# Patient Record
Sex: MALE | Race: WHITE | NOT HISPANIC OR LATINO
[De-identification: names, ages, dates, MRNs, and addresses within clinical notes are randomized per-mention and may not be internally consistent; named-entity substitution may affect disease eponyms.]

---

## 2017-01-23 ENCOUNTER — TRANSCRIPTION ENCOUNTER (OUTPATIENT)
Age: 27
End: 2017-01-23

## 2018-12-13 ENCOUNTER — APPOINTMENT (OUTPATIENT)
Dept: OTOLARYNGOLOGY | Facility: CLINIC | Age: 28
End: 2018-12-13

## 2019-03-14 ENCOUNTER — APPOINTMENT (OUTPATIENT)
Dept: OTOLARYNGOLOGY | Facility: CLINIC | Age: 29
End: 2019-03-14

## 2019-03-15 ENCOUNTER — APPOINTMENT (OUTPATIENT)
Dept: OTOLARYNGOLOGY | Facility: CLINIC | Age: 29
End: 2019-03-15

## 2019-07-31 ENCOUNTER — APPOINTMENT (OUTPATIENT)
Dept: UROLOGY | Facility: CLINIC | Age: 29
End: 2019-07-31
Payer: MEDICAID

## 2019-07-31 VITALS
BODY MASS INDEX: 25.92 KG/M2 | HEART RATE: 87 BPM | WEIGHT: 175 LBS | SYSTOLIC BLOOD PRESSURE: 110 MMHG | DIASTOLIC BLOOD PRESSURE: 66 MMHG | HEIGHT: 69 IN | OXYGEN SATURATION: 99 % | RESPIRATION RATE: 14 BRPM

## 2019-07-31 DIAGNOSIS — N45.2 ORCHITIS: ICD-10-CM

## 2019-07-31 DIAGNOSIS — Z78.9 OTHER SPECIFIED HEALTH STATUS: ICD-10-CM

## 2019-07-31 DIAGNOSIS — Z84.1 FAMILY HISTORY OF DISORDERS OF KIDNEY AND URETER: ICD-10-CM

## 2019-07-31 PROCEDURE — 99203 OFFICE O/P NEW LOW 30 MIN: CPT

## 2019-07-31 NOTE — LETTER BODY
[Dear  ___] : Dear  [unfilled], [Consult Letter:] : I had the pleasure of evaluating your patient, [unfilled]. [Consult Closing:] : Thank you very much for allowing me to participate in the care of this patient.  If you have any questions, please do not hesitate to contact me. [FreeTextEntry1] : [address]\[place] [address]

## 2019-07-31 NOTE — PHYSICAL EXAM
[General Appearance - Well Nourished] : well nourished [Normal Appearance] : normal appearance [General Appearance - Well Developed] : well developed [Well Groomed] : well groomed [General Appearance - In No Acute Distress] : no acute distress [Edema] : no peripheral edema [Respiration, Rhythm And Depth] : normal respiratory rhythm and effort [Abdomen Soft] : soft [Exaggerated Use Of Accessory Muscles For Inspiration] : no accessory muscle use [Abdomen Tenderness] : non-tender [Costovertebral Angle Tenderness] : no ~M costovertebral angle tenderness [Urethral Meatus] : meatus normal [Penis Abnormality] : normal circumcised penis [Urinary Bladder Findings] : the bladder was normal on palpation [Epididymis] : the epididymides were normal [Scrotum] : the scrotum was normal [Testes Tenderness] : no tenderness of the testes [Testes Mass (___cm)] : there were no testicular masses [Normal Station and Gait] : the gait and station were normal for the patient's age [] : no rash [No Focal Deficits] : no focal deficits [Affect] : the affect was normal [Oriented To Time, Place, And Person] : oriented to person, place, and time [Mood] : the mood was normal [Not Anxious] : not anxious [Inguinal Lymph Nodes Enlarged Bilaterally] : inguinal

## 2019-07-31 NOTE — ASSESSMENT
[FreeTextEntry1] : Genital exam is unremarkable. He was told that laboratory testing was normal. He was reassured. Ice packs, elevation and scrotal support, anti-inflammatory agents could be used. Also scrotal ultrasound will be performed. He could call me for the results.\par \par Frank Gutierrez MD, FACS\par [facility]\par  of Urology\par \par [address], [address]\[place] [address]\par \par [address], [address]\[place] [address]\par \par Tel: [phone]\par Fax: [phone]

## 2019-07-31 NOTE — HISTORY OF PRESENT ILLNESS
[FreeTextEntry1] : He is a 29-year-old man who is seen today for initial visit. In the last one to 2 weeks, he has mild left-sided testicular discomfort. He went to an urgent care center and he was told laboratory testing for gonorrhea, chlamydia and urine culture were normal. He was given Cipro. There is no urethral discharge. There is no history of trauma. The scrotum does not look red or irritated. He does not lift heavy objects. There is no history of kidney stones.

## 2022-06-16 ENCOUNTER — APPOINTMENT (OUTPATIENT)
Dept: HUMAN REPRODUCTION | Facility: CLINIC | Age: 32
End: 2022-06-16

## 2022-12-20 ENCOUNTER — APPOINTMENT (OUTPATIENT)
Dept: DERMATOLOGY | Facility: CLINIC | Age: 32
End: 2022-12-20

## 2022-12-20 VITALS — BODY MASS INDEX: 25.18 KG/M2 | HEIGHT: 69 IN | WEIGHT: 170 LBS

## 2022-12-20 DIAGNOSIS — Q82.8 OTHER SPECIFIED CONGENITAL MALFORMATIONS OF SKIN: ICD-10-CM

## 2022-12-20 DIAGNOSIS — L73.9 FOLLICULAR DISORDER, UNSPECIFIED: ICD-10-CM

## 2022-12-20 DIAGNOSIS — L72.9 FOLLICULAR CYST OF THE SKIN AND SUBCUTANEOUS TISSUE, UNSPECIFIED: ICD-10-CM

## 2022-12-20 PROCEDURE — 99204 OFFICE O/P NEW MOD 45 MIN: CPT

## 2023-03-15 ENCOUNTER — APPOINTMENT (OUTPATIENT)
Dept: ORTHOPEDIC SURGERY | Facility: CLINIC | Age: 33
End: 2023-03-15

## 2023-04-04 ENCOUNTER — APPOINTMENT (OUTPATIENT)
Dept: ORTHOPEDIC SURGERY | Facility: CLINIC | Age: 33
End: 2023-04-04
Payer: COMMERCIAL

## 2023-04-04 VITALS — WEIGHT: 170 LBS | HEIGHT: 69 IN | BODY MASS INDEX: 25.18 KG/M2

## 2023-04-04 DIAGNOSIS — M76.822 POSTERIOR TIBIAL TENDINITIS, LEFT LEG: ICD-10-CM

## 2023-04-04 PROCEDURE — 73610 X-RAY EXAM OF ANKLE: CPT | Mod: RT

## 2023-04-04 PROCEDURE — 99203 OFFICE O/P NEW LOW 30 MIN: CPT

## 2023-04-04 RX ORDER — CLINDAMYCIN PHOSPHATE 1 G/10ML
1 GEL TOPICAL
Qty: 1 | Refills: 5 | Status: COMPLETED | COMMUNITY
Start: 2022-12-20 | End: 2023-04-04

## 2023-04-04 RX ORDER — BENZOYL PEROXIDE 5 G/100G
5 LIQUID TOPICAL
Qty: 1 | Refills: 10 | Status: COMPLETED | COMMUNITY
Start: 2022-12-20 | End: 2023-04-04

## 2023-04-04 NOTE — HISTORY OF PRESENT ILLNESS
[Sudden] : sudden [5] : 5 [0] : 0 [Sharp] : sharp [Occasional] : occasional [de-identified] : 04/04/2023 Mr. WAGNER SAMS, a 32 year old male, presents today for  right ankle pain, present for the past 3 weeks. Denies injury or trauma. Pain has improved but can't turn ankle outward without pain.  Notes occasional pain over the lower leg. Reports pain has been gradually improving over the past few weeks.  [] : no [de-identified] : running/jumping

## 2023-04-04 NOTE — PHYSICAL EXAM
[Right] : right foot and ankle [NL (40)] : plantar flexion 40 degrees [NL 30)] : inversion 30 degrees [NL (20)] : eversion 20 degrees [5___] : [facility] 5[unfilled]/5 [] : no difficulty with single heel rise [FreeTextEntry9] : pain with inversion

## 2023-04-04 NOTE — DISCUSSION/SUMMARY
[de-identified] : 32m with posterior tib tendinitis, improving \par 1) cryotherapy, rest and activity modification\par 2) recommend use of brace if pain worsens\par 3) activity as tolerated\par 4) rtc prn \par \par \par Entered by Yu Bell acting as scribe.\par Dr. Mccarthy-\par The documentation recorded by the scribe accurately reflects the service I personally performed and the decisions made by me. \par

## 2023-06-07 ENCOUNTER — APPOINTMENT (OUTPATIENT)
Dept: PAIN MANAGEMENT | Facility: CLINIC | Age: 33
End: 2023-06-07
Payer: COMMERCIAL

## 2023-06-07 VITALS — HEIGHT: 69 IN | WEIGHT: 175 LBS | BODY MASS INDEX: 25.92 KG/M2

## 2023-06-07 DIAGNOSIS — M54.17 RADICULOPATHY, LUMBOSACRAL REGION: ICD-10-CM

## 2023-06-07 PROCEDURE — 99204 OFFICE O/P NEW MOD 45 MIN: CPT

## 2023-06-07 NOTE — HISTORY OF PRESENT ILLNESS
[Lower back] : lower back [5] : 5 [Tingling] : tingling [Constant] : constant [Sleep] : sleep [Standing] : standing [Walking] : walking [Bending forward] : bending forward [Lying in bed] : lying in bed [FreeTextEntry1] : pt states he is having pain in the lower back , he bend forward putting a box  [] : no [FreeTextEntry7] : b/l feet , mid back  [de-identified] : breathing

## 2023-06-07 NOTE — DISCUSSION/SUMMARY
[de-identified] : sp lifting with acute back pain and bl paresthesias\par MRI ls spine and start medrol dose pack

## 2023-06-07 NOTE — PHYSICAL EXAM
[de-identified] : PHYSICAL EXAM\par \par Constitutional: \par Appears well, no apparent distress\par Ability to communicate: Normal\par Respiratory: non-labored breathing\par Skin: no rash noted\par Head: normocephalic, atraumatic\par Neck: no visible thyroid enlargement\par Eyes: extraocular movements intact\par Neurologic: alert and oriented x3\par Psychiatric: normal mood, affect, and behavior\par \par Lumbar Spine: \par Palpation: left and right lumbar paraspinal spasm and left and right lumbar paraspinal tenderness to palpation.\par ROM: Diminished range of motion in all plains.  Patient notes pain with lateral bending to the left and right.\par MMT: Motor exam is 5/5 through out bilateral lower extremities.\par Sensation: Light touch and pain is intact throughout bilateral lower extremities.\par Reflexes: achilles and patella reflexes are intact and  symmetrical.  No sustained clonus.\par Special Testing: Positive straight leg raise on the left and right side.\par \par

## 2023-06-19 ENCOUNTER — APPOINTMENT (OUTPATIENT)
Dept: MRI IMAGING | Facility: CLINIC | Age: 33
End: 2023-06-19

## 2024-03-01 ENCOUNTER — NON-APPOINTMENT (OUTPATIENT)
Age: 34
End: 2024-03-01

## 2024-03-03 ENCOUNTER — NON-APPOINTMENT (OUTPATIENT)
Age: 34
End: 2024-03-03

## 2024-03-03 ENCOUNTER — EMERGENCY (EMERGENCY)
Facility: HOSPITAL | Age: 34
LOS: 1 days | Discharge: HOME | End: 2024-03-03
Attending: STUDENT IN AN ORGANIZED HEALTH CARE EDUCATION/TRAINING PROGRAM
Payer: COMMERCIAL

## 2024-03-03 VITALS
OXYGEN SATURATION: 99 % | DIASTOLIC BLOOD PRESSURE: 70 MMHG | HEART RATE: 82 BPM | RESPIRATION RATE: 18 BRPM | SYSTOLIC BLOOD PRESSURE: 104 MMHG | TEMPERATURE: 98 F

## 2024-03-03 VITALS
OXYGEN SATURATION: 100 % | HEIGHT: 69 IN | TEMPERATURE: 98 F | DIASTOLIC BLOOD PRESSURE: 62 MMHG | WEIGHT: 169.98 LBS | HEART RATE: 101 BPM | SYSTOLIC BLOOD PRESSURE: 108 MMHG | RESPIRATION RATE: 18 BRPM

## 2024-03-03 LAB
ALBUMIN SERPL ELPH-MCNC: 4.3 G/DL — SIGNIFICANT CHANGE UP (ref 3.3–5)
ALP SERPL-CCNC: 75 U/L — SIGNIFICANT CHANGE UP (ref 40–120)
ALT FLD-CCNC: 39 U/L — SIGNIFICANT CHANGE UP (ref 10–45)
ANION GAP SERPL CALC-SCNC: 11 MMOL/L — SIGNIFICANT CHANGE UP (ref 5–17)
APTT BLD: 31 SEC — SIGNIFICANT CHANGE UP (ref 24.5–35.6)
AST SERPL-CCNC: 23 U/L — SIGNIFICANT CHANGE UP (ref 10–40)
BASOPHILS # BLD AUTO: 0.02 K/UL — SIGNIFICANT CHANGE UP (ref 0–0.2)
BASOPHILS NFR BLD AUTO: 0.1 % — SIGNIFICANT CHANGE UP (ref 0–2)
BILIRUB SERPL-MCNC: 0.4 MG/DL — SIGNIFICANT CHANGE UP (ref 0.2–1.2)
BUN SERPL-MCNC: 11 MG/DL — SIGNIFICANT CHANGE UP (ref 7–23)
CALCIUM SERPL-MCNC: 9.7 MG/DL — SIGNIFICANT CHANGE UP (ref 8.4–10.5)
CHLORIDE SERPL-SCNC: 102 MMOL/L — SIGNIFICANT CHANGE UP (ref 96–108)
CO2 SERPL-SCNC: 26 MMOL/L — SIGNIFICANT CHANGE UP (ref 22–31)
CREAT SERPL-MCNC: 0.91 MG/DL — SIGNIFICANT CHANGE UP (ref 0.5–1.3)
EGFR: 113 ML/MIN/1.73M2 — SIGNIFICANT CHANGE UP
EOSINOPHIL # BLD AUTO: 0.12 K/UL — SIGNIFICANT CHANGE UP (ref 0–0.5)
EOSINOPHIL NFR BLD AUTO: 0.8 % — SIGNIFICANT CHANGE UP (ref 0–6)
GLUCOSE SERPL-MCNC: 133 MG/DL — HIGH (ref 70–99)
HCT VFR BLD CALC: 42.1 % — SIGNIFICANT CHANGE UP (ref 39–50)
HGB BLD-MCNC: 14.7 G/DL — SIGNIFICANT CHANGE UP (ref 13–17)
IMM GRANULOCYTES NFR BLD AUTO: 0.4 % — SIGNIFICANT CHANGE UP (ref 0–0.9)
INR BLD: 1.16 RATIO — SIGNIFICANT CHANGE UP (ref 0.85–1.18)
LYMPHOCYTES # BLD AUTO: 1.23 K/UL — SIGNIFICANT CHANGE UP (ref 1–3.3)
LYMPHOCYTES # BLD AUTO: 8.7 % — LOW (ref 13–44)
MCHC RBC-ENTMCNC: 30.1 PG — SIGNIFICANT CHANGE UP (ref 27–34)
MCHC RBC-ENTMCNC: 34.9 GM/DL — SIGNIFICANT CHANGE UP (ref 32–36)
MCV RBC AUTO: 86.3 FL — SIGNIFICANT CHANGE UP (ref 80–100)
MONOCYTES # BLD AUTO: 1.18 K/UL — HIGH (ref 0–0.9)
MONOCYTES NFR BLD AUTO: 8.3 % — SIGNIFICANT CHANGE UP (ref 2–14)
NEUTROPHILS # BLD AUTO: 11.56 K/UL — HIGH (ref 1.8–7.4)
NEUTROPHILS NFR BLD AUTO: 81.7 % — HIGH (ref 43–77)
NRBC # BLD: 0 /100 WBCS — SIGNIFICANT CHANGE UP (ref 0–0)
PLATELET # BLD AUTO: 202 K/UL — SIGNIFICANT CHANGE UP (ref 150–400)
POTASSIUM SERPL-MCNC: 3.9 MMOL/L — SIGNIFICANT CHANGE UP (ref 3.5–5.3)
POTASSIUM SERPL-SCNC: 3.9 MMOL/L — SIGNIFICANT CHANGE UP (ref 3.5–5.3)
PROT SERPL-MCNC: 7.2 G/DL — SIGNIFICANT CHANGE UP (ref 6–8.3)
PROTHROM AB SERPL-ACNC: 12.1 SEC — SIGNIFICANT CHANGE UP (ref 9.5–13)
RBC # BLD: 4.88 M/UL — SIGNIFICANT CHANGE UP (ref 4.2–5.8)
RBC # FLD: 12.4 % — SIGNIFICANT CHANGE UP (ref 10.3–14.5)
SODIUM SERPL-SCNC: 139 MMOL/L — SIGNIFICANT CHANGE UP (ref 135–145)
WBC # BLD: 14.16 K/UL — HIGH (ref 3.8–10.5)
WBC # FLD AUTO: 14.16 K/UL — HIGH (ref 3.8–10.5)

## 2024-03-03 PROCEDURE — 96375 TX/PRO/DX INJ NEW DRUG ADDON: CPT

## 2024-03-03 PROCEDURE — 99284 EMERGENCY DEPT VISIT MOD MDM: CPT | Mod: 25

## 2024-03-03 PROCEDURE — 85610 PROTHROMBIN TIME: CPT

## 2024-03-03 PROCEDURE — 96374 THER/PROPH/DIAG INJ IV PUSH: CPT | Mod: XU

## 2024-03-03 PROCEDURE — 74177 CT ABD & PELVIS W/CONTRAST: CPT | Mod: 26,MC

## 2024-03-03 PROCEDURE — 80053 COMPREHEN METABOLIC PANEL: CPT

## 2024-03-03 PROCEDURE — 74177 CT ABD & PELVIS W/CONTRAST: CPT | Mod: MC

## 2024-03-03 PROCEDURE — 99285 EMERGENCY DEPT VISIT HI MDM: CPT

## 2024-03-03 PROCEDURE — 85025 COMPLETE CBC W/AUTO DIFF WBC: CPT

## 2024-03-03 PROCEDURE — 85730 THROMBOPLASTIN TIME PARTIAL: CPT

## 2024-03-03 PROCEDURE — 96376 TX/PRO/DX INJ SAME DRUG ADON: CPT

## 2024-03-03 PROCEDURE — 99053 MED SERV 10PM-8AM 24 HR FAC: CPT

## 2024-03-03 RX ORDER — ACETAMINOPHEN 500 MG
1000 TABLET ORAL ONCE
Refills: 0 | Status: COMPLETED | OUTPATIENT
Start: 2024-03-03 | End: 2024-03-03

## 2024-03-03 RX ORDER — LIDOCAINE 4 G/100G
1 CREAM TOPICAL ONCE
Refills: 0 | Status: DISCONTINUED | OUTPATIENT
Start: 2024-03-03 | End: 2024-03-06

## 2024-03-03 RX ORDER — KETOROLAC TROMETHAMINE 30 MG/ML
15 SYRINGE (ML) INJECTION ONCE
Refills: 0 | Status: DISCONTINUED | OUTPATIENT
Start: 2024-03-03 | End: 2024-03-03

## 2024-03-03 RX ADMIN — Medication 400 MILLIGRAM(S): at 02:05

## 2024-03-03 RX ADMIN — Medication 15 MILLIGRAM(S): at 02:05

## 2024-03-03 RX ADMIN — Medication 1000 MILLIGRAM(S): at 03:00

## 2024-03-03 RX ADMIN — Medication 15 MILLIGRAM(S): at 03:00

## 2024-03-03 RX ADMIN — Medication 15 MILLIGRAM(S): at 04:57

## 2024-03-03 NOTE — ED PROVIDER NOTE - PROGRESS NOTE DETAILS
Zach Bianchi DO (PGY2)  Patient CAT scan revealing signs of external hemorrhoid without abscess.  Patient afebrile here.  Discussed follow-up with colorectal surgery outpatient.  Educated on external hemorrhoid treatment at home.  Patient to continue sitz bath.  Will give colorectal follow-up.  Answered all questions for patient and family member at bedside prior to discharge, will discharge

## 2024-03-03 NOTE — ED ADULT NURSE NOTE - OBJECTIVE STATEMENT
33y M, no pmhx, presenting with rectal pain that started yesterday. pt states he went to UC and was told it was likely hemorrhoids and given ointment - told to go to ED if pain increases. Pt reports pain has become significantly worse since then - 8-10/10, sharp, burning pain at rectum with/without movement. also reports feeling pressure in rectum when standing. denies blood in stool, n/v/d, abd pain. no known injury or heavy lifting. denies recent straining or constipation. took Tylenol at ~ 5pm without any relief.

## 2024-03-03 NOTE — ED PROVIDER NOTE - CLINICAL SUMMARY MEDICAL DECISION MAKING FREE TEXT BOX
33-year-old male no medical history presenting with rectal pain that started yesterday.  States he went to urgent care this morning was told that he might have hemorrhoids.  Patient was given a steroid ointment.  Patient reports pain has been significantly worse since then, sharp and burning pain at the rectum.  Endorsing pressure at the rectum with standing.  Denies any bloody stools, nausea vomiting diarrhea or abdominal pain.  Vital signs stable, afebrile, not hypoxic. Plan for rectal exam, pain control 33-year-old male no medical history presenting with rectal pain that started yesterday.  States he went to urgent care this morning was told that he might have hemorrhoids.  Patient was given a steroid ointment.  Patient reports pain has been significantly worse since then, sharp and burning pain at the rectum.  Endorsing pressure at the rectum with standing.  Denies any bloody stools, nausea vomiting diarrhea or abdominal pain.  Vital signs stable, afebrile, not hypoxic. Plan for rectal exam, pain control, CT abdomen pelvis, basic labs.  Plan for evaluation of extension of bandlike structure at anal orifice

## 2024-03-03 NOTE — ED PROVIDER NOTE - PHYSICAL EXAMINATION
Zach Bianchi DO (PGY2)   Physical Exam:    Gen: NAD, AOx3  Head: NCAT  HEENT: EOMI, PEERLA  Lung: CTAB, no respiratory distress, no wheezes/rhonchi/rales B/L  CV: RRR, no murmurs, rubs or gallops  Abd: soft, NT, ND, no guarding, no rigidity, no rebound tenderness, no CVA tenderness   : Rectal Exam -   MSK: no visible deformities, ROM normal in UE/LE, no back pain  Neuro: No focal sensory or motor deficits. Sensation intact to light touch all extremities.  Skin: Warm, well perfused, no rash, no leg swelling  Psych: normal affect, calm Zach Bianchi DO (PGY2)   Physical Exam:    Gen: NAD, AOx3  Head: NCAT  HEENT: EOMI, PEERLA  Lung: CTAB, no respiratory distress, no wheezes/rhonchi/rales B/L  CV: RRR, no murmurs, rubs or gallops  Abd: soft, NT, ND, no guarding, no rigidity, no rebound tenderness, no CVA tenderness   : Rectal Exam - Engorged bandlike structure localized to inferior rectal verge, tender to palpation without obvious drainage.  MSK: no visible deformities, ROM normal in UE/LE, no back pain  Neuro: No focal sensory or motor deficits. Sensation intact to light touch all extremities.  Skin: Warm, well perfused, no rash, no leg swelling  Psych: normal affect, calm

## 2024-03-03 NOTE — ED PROVIDER NOTE - ATTENDING CONTRIBUTION TO CARE
Attending (Shawn Maldonado D.O.):  I have personally seen and examined this patient. I have performed a substantive portion of the visit including all aspects of the medical decision making. Resident, fellow, student, and/or ACP note reviewed. I agree on the plan of care except where noted.    33M here for few days of rectal pain w/o report of trauma, infx sxs, went to [facility], confused patient with their recs. Trying steroid cream w/o improvement. Hemodynam stable, NAD, benign abd, no peritoneal signs. Rectal exam done by Dr. Bianchi, w/o internal fluctuance or crepitations. at 6'o clock position, there is a region that extends to penile base which appears erythematous, thickened, and tender but feels firm, not necessarily fluctuant. Will need to eval this structure further with CT, no internal extension based on exam. Do not see signs of systemic infection. Plan to eval with labs, CT, dose analgesia.

## 2024-03-03 NOTE — ED PROVIDER NOTE - CARE PROVIDER_API CALL
Ric Hills  Colon/Rectal Surgery  [address] [place] [address], [address]  [place] [address]  Phone: [phone]  Fax: [phone]  Follow Up Time: 1-3 Days

## 2024-03-03 NOTE — ED PROVIDER NOTE - NSFOLLOWUPINSTRUCTIONS_ED_ALL_ED_FT
You are seen in the emergency department today for rectal pain.  You had a CAT scan performed which showed signs of an external hemorrhoid.    Please continue using the ointment that was prescribed to you by urgent care  You can continue to take Tylenol and Motrin.  You will be given a referral for colorectal surgery, they will call you to set up an appointment.    Eat high-fiber foods. Eat more foods that are high in fiber. This helps softens the stool and increases its bulk, which will help you avoid straining. Add fiber to your diet slowly to avoid problems with gas.    Use topical treatments. Apply a hemorrhoid cream or suppository containing hydrocortisone that you can buy without a prescription. You also can use pads containing witch hazel or a numbing medicine.    Soak regularly in a warm bath or sitz bath. Soak your anal area in plain warm water for 10 to 15 minutes two or three times a day. A sitz bath fits over the toilet.    Take pain relievers by mouth. You can temporarily use acetaminophen (Tylenol, others), aspirin or ibuprofen (Advil, Motrin IB, others) to help relieve your discomfort.

## 2024-03-03 NOTE — ED PROVIDER NOTE - PATIENT PORTAL LINK FT
You can access the FollowMyHealth Patient Portal offered by [facility] by registering at the following website: [email or web address]. By joining [facility]’s FollowMyHealth portal, you will also be able to view your health information using other applications (apps) compatible with our system.

## 2024-03-03 NOTE — ED PROVIDER NOTE - OBJECTIVE STATEMENT
33-year-old male no medical history presenting with rectal pain that started yesterday.  States he went to urgent care this morning was told that he might have hemorrhoids.  Patient was given a steroid ointment.  Patient reports pain has been significantly worse since then, sharp and burning pain at the rectum.  Endorsing pressure at the rectum with standing.  Denies any bloody stools, nausea vomiting diarrhea or abdominal pain.  Denies any injury to the area, heavy lifting or anal receptive sex.  States he took Tylenol at 5 PM without relief.

## 2024-03-04 ENCOUNTER — APPOINTMENT (OUTPATIENT)
Dept: COLORECTAL SURGERY | Facility: CLINIC | Age: 34
End: 2024-03-04
Payer: COMMERCIAL

## 2024-03-04 VITALS
HEART RATE: 106 BPM | SYSTOLIC BLOOD PRESSURE: 113 MMHG | HEIGHT: 69 IN | OXYGEN SATURATION: 98 % | TEMPERATURE: 98.4 F | RESPIRATION RATE: 15 BRPM | WEIGHT: 175 LBS | DIASTOLIC BLOOD PRESSURE: 70 MMHG | BODY MASS INDEX: 25.92 KG/M2

## 2024-03-04 DIAGNOSIS — Z80.0 FAMILY HISTORY OF MALIGNANT NEOPLASM OF DIGESTIVE ORGANS: ICD-10-CM

## 2024-03-04 PROCEDURE — 99204 OFFICE O/P NEW MOD 45 MIN: CPT

## 2024-03-04 RX ORDER — OMEPRAZOLE MAGNESIUM 20 MG/1
20 CAPSULE, DELAYED RELEASE ORAL
Refills: 0 | Status: ACTIVE | COMMUNITY

## 2024-03-04 RX ORDER — METHYLPREDNISOLONE 4 MG/1
4 TABLET ORAL
Qty: 21 | Refills: 0 | Status: DISCONTINUED | COMMUNITY
Start: 2023-06-07 | End: 2024-03-04

## 2024-03-04 RX ORDER — MELOXICAM 15 MG/1
15 TABLET ORAL
Qty: 30 | Refills: 0 | Status: DISCONTINUED | COMMUNITY
Start: 2023-06-07 | End: 2024-03-04

## 2024-03-04 NOTE — PHYSICAL EXAM
[Normal Breath Sounds] : Normal breath sounds [Normal Heart Sounds] : normal heart sounds [Normal Rate and Rhythm] : normal rate and rhythm [No Rash or Lesion] : No rash or lesion [Oriented to Person] : oriented to person [Alert] : alert [Oriented to Place] : oriented to place [Oriented to Time] : oriented to time [Calm] : calm [Abdomen Masses] : No abdominal masses [Exam Deferred] : exam was deferred [Abdomen Tenderness] : ~T No ~M abdominal tenderness [None] : no anal fissures seen [Wheezing] : no wheezing was heard [de-identified] : R anterior ruptured thrombosed vs. abscess. [de-identified] : healthy, well nourished [de-identified] : NC/AT [de-identified] : supple

## 2024-03-04 NOTE — ASSESSMENT
[FreeTextEntry1] : 33 M with anterior R anal fistula vs. thrombosed rutputre  EUA fistultotomy vs. hemorrhoidectomy.

## 2024-03-04 NOTE — HISTORY OF PRESENT ILLNESS
[FreeTextEntry1] : 32 y/o male with hx of hemorrhoids presents today for initial consultation.  He was seen in the ED at [facility] on 3/2/24 for complaint of rectal pain.  CT Abdomen/pelvis was done.  He denies any bleeding at that time.  He was discharged and given referral to colorectal surgery.    He presents today with complaint of rectal pain, but notes some improvement because he feels that it "popped overnight" and he notes bloody drainage.    He has never had a colonoscopy.

## 2024-03-07 ENCOUNTER — OUTPATIENT (OUTPATIENT)
Dept: OUTPATIENT SERVICES | Facility: HOSPITAL | Age: 34
LOS: 1 days | End: 2024-03-07
Payer: COMMERCIAL

## 2024-03-07 VITALS
DIASTOLIC BLOOD PRESSURE: 70 MMHG | RESPIRATION RATE: 15 BRPM | WEIGHT: 173.94 LBS | TEMPERATURE: 98 F | SYSTOLIC BLOOD PRESSURE: 105 MMHG | OXYGEN SATURATION: 97 % | HEART RATE: 72 BPM | HEIGHT: 69 IN

## 2024-03-07 DIAGNOSIS — K64.8 OTHER HEMORRHOIDS: ICD-10-CM

## 2024-03-07 DIAGNOSIS — Z87.19 PERSONAL HISTORY OF OTHER DISEASES OF THE DIGESTIVE SYSTEM: ICD-10-CM

## 2024-03-07 DIAGNOSIS — Z01.818 ENCOUNTER FOR OTHER PREPROCEDURAL EXAMINATION: ICD-10-CM

## 2024-03-07 PROCEDURE — 80048 BASIC METABOLIC PNL TOTAL CA: CPT

## 2024-03-07 PROCEDURE — G0463: CPT

## 2024-03-07 PROCEDURE — 85027 COMPLETE CBC AUTOMATED: CPT

## 2024-03-07 PROCEDURE — 36415 COLL VENOUS BLD VENIPUNCTURE: CPT

## 2024-03-07 RX ORDER — SODIUM CHLORIDE 9 MG/ML
1000 INJECTION, SOLUTION INTRAVENOUS
Refills: 0 | Status: DISCONTINUED | OUTPATIENT
Start: 2024-03-08 | End: 2024-03-22

## 2024-03-07 NOTE — H&P PST ADULT - HISTORY OF PRESENT ILLNESS
35 yr old male note pain anal rectal area 3/1 evaluation by MD driscoll with thrombosed hemorrhoid possible fistula referred for surgery.

## 2024-03-07 NOTE — H&P PST ADULT - ASSESSMENT
DASI: yard work  shovel [employer] 9  Symptoms : Denies SOB, MARIE, palpitations  Airway : no airway abnormalities , denies prior anesthesia complications   Mallampati : 2  Denies loose teeth    Corneal abrasion risk : Denies

## 2024-03-07 NOTE — H&P PST ADULT - NSICDXPASTMEDICALHX_GEN_ALL_CORE_FT
PAST MEDICAL HISTORY:  2019 novel coronavirus disease (COVID-19)     Anal fistula     H/O hemorrhoids

## 2024-03-08 ENCOUNTER — TRANSCRIPTION ENCOUNTER (OUTPATIENT)
Age: 34
End: 2024-03-08

## 2024-03-08 ENCOUNTER — APPOINTMENT (OUTPATIENT)
Dept: COLORECTAL SURGERY | Facility: HOSPITAL | Age: 34
End: 2024-03-08

## 2024-03-08 ENCOUNTER — RESULT REVIEW (OUTPATIENT)
Age: 34
End: 2024-03-08

## 2024-03-08 ENCOUNTER — OUTPATIENT (OUTPATIENT)
Dept: OUTPATIENT SERVICES | Facility: HOSPITAL | Age: 34
LOS: 1 days | End: 2024-03-08
Payer: COMMERCIAL

## 2024-03-08 VITALS
DIASTOLIC BLOOD PRESSURE: 59 MMHG | HEART RATE: 82 BPM | TEMPERATURE: 98 F | RESPIRATION RATE: 14 BRPM | SYSTOLIC BLOOD PRESSURE: 108 MMHG | OXYGEN SATURATION: 98 %

## 2024-03-08 VITALS
HEIGHT: 69 IN | OXYGEN SATURATION: 98 % | TEMPERATURE: 98 F | SYSTOLIC BLOOD PRESSURE: 95 MMHG | DIASTOLIC BLOOD PRESSURE: 61 MMHG | HEART RATE: 83 BPM | WEIGHT: 173.94 LBS | RESPIRATION RATE: 16 BRPM

## 2024-03-08 DIAGNOSIS — K64.8 OTHER HEMORRHOIDS: ICD-10-CM

## 2024-03-08 PROCEDURE — 46257 REMOVE IN/EX HEM GRP & FISS: CPT

## 2024-03-08 PROCEDURE — 88304 TISSUE EXAM BY PATHOLOGIST: CPT

## 2024-03-08 PROCEDURE — 46270 REMOVE ANAL FIST SUBQ: CPT

## 2024-03-08 PROCEDURE — 46255 REMOVE INT/EXT HEM 1 GROUP: CPT

## 2024-03-08 PROCEDURE — 88304 TISSUE EXAM BY PATHOLOGIST: CPT | Mod: 26

## 2024-03-08 PROCEDURE — C1889: CPT

## 2024-03-08 DEVICE — SURGICEL FIBRILLAR 2 X 4": Type: IMPLANTABLE DEVICE | Status: FUNCTIONAL

## 2024-03-08 RX ORDER — FENTANYL CITRATE 50 UG/ML
50 INJECTION INTRAVENOUS
Refills: 0 | Status: DISCONTINUED | OUTPATIENT
Start: 2024-03-08 | End: 2024-03-08

## 2024-03-08 RX ORDER — OXYCODONE HYDROCHLORIDE 5 MG/1
1 TABLET ORAL
Qty: 15 | Refills: 0
Start: 2024-03-08

## 2024-03-08 RX ORDER — OXYCODONE HYDROCHLORIDE 5 MG/1
5 TABLET ORAL ONCE
Refills: 0 | Status: DISCONTINUED | OUTPATIENT
Start: 2024-03-08 | End: 2024-03-08

## 2024-03-08 RX ORDER — ACETAMINOPHEN 500 MG
1000 TABLET ORAL ONCE
Refills: 0 | Status: DISCONTINUED | OUTPATIENT
Start: 2024-03-08 | End: 2024-03-22

## 2024-03-08 RX ORDER — OXYCODONE HYDROCHLORIDE 5 MG/1
10 TABLET ORAL ONCE
Refills: 0 | Status: DISCONTINUED | OUTPATIENT
Start: 2024-03-08 | End: 2024-03-08

## 2024-03-08 RX ORDER — LIDOCAINE HCL 20 MG/ML
0.2 VIAL (ML) INJECTION ONCE
Refills: 0 | Status: COMPLETED | OUTPATIENT
Start: 2024-03-08 | End: 2024-03-08

## 2024-03-08 RX ORDER — OMEPRAZOLE 10 MG/1
1 CAPSULE, DELAYED RELEASE ORAL
Refills: 0 | DISCHARGE

## 2024-03-08 RX ADMIN — SODIUM CHLORIDE 100 MILLILITER(S): 9 INJECTION, SOLUTION INTRAVENOUS at 10:41

## 2024-03-08 NOTE — BRIEF OPERATIVE NOTE - NSICDXBRIEFPROCEDURE_GEN_ALL_CORE_FT
PROCEDURES:  Simple hemorrhoidectomy with surgical removal of fistula 08-Mar-2024 12:02:27  Mingo Ascencio

## 2024-03-08 NOTE — ASU DISCHARGE PLAN (ADULT/PEDIATRIC) - ASU DC SPECIAL INSTRUCTIONSFT
Pre-printed instructions from Dr. Sutton's office will be provided in PACU prior to dc that will have information for post-op care and follow-up.

## 2024-03-08 NOTE — ASU DISCHARGE PLAN (ADULT/PEDIATRIC) - CARE PROVIDER_API CALL
Sonu Sutton  Colon/Rectal Surgery  [address], [address]  [place] [address]  Phone: [phone]  Fax: [phone]  Follow Up Time: 2 weeks

## 2024-03-08 NOTE — ASU DISCHARGE PLAN (ADULT/PEDIATRIC) - NURSING INSTRUCTIONS
If needed you can take Tylenol 650mg-1000mg again at 4pm for pain. Please do not exceed more then 4000mg in 24 hours. You can also alternate with Ibuprofen 600mg and take if every 6 hours, 3 hours apart from Tylenol with food. The next time you can take Ibuprofen is as soon as you get home.

## 2024-03-11 PROBLEM — Z87.19 PERSONAL HISTORY OF OTHER DISEASES OF THE DIGESTIVE SYSTEM: Chronic | Status: ACTIVE | Noted: 2024-03-07

## 2024-03-11 PROBLEM — K60.3 ANAL FISTULA: Chronic | Status: ACTIVE | Noted: 2024-03-07

## 2024-03-11 PROBLEM — U07.1 COVID-19: Chronic | Status: ACTIVE | Noted: 2024-03-07

## 2024-03-12 LAB — SURGICAL PATHOLOGY STUDY: SIGNIFICANT CHANGE UP

## 2024-03-20 ENCOUNTER — APPOINTMENT (OUTPATIENT)
Dept: COLORECTAL SURGERY | Facility: CLINIC | Age: 34
End: 2024-03-20
Payer: COMMERCIAL

## 2024-03-20 PROCEDURE — 99024 POSTOP FOLLOW-UP VISIT: CPT

## 2024-03-20 NOTE — ASSESSMENT
[FreeTextEntry1] : 33-year-old male Status post hemorrhoidectomy recovering well  Continue sitz baths fiber supplementation and follow up as needed.

## 2024-03-20 NOTE — HISTORY OF PRESENT ILLNESS
[FreeTextEntry1] : 33-year-old male status post hemorrhoidectomy recovering well. He has no complaints.

## 2024-04-10 ENCOUNTER — APPOINTMENT (OUTPATIENT)
Dept: COLORECTAL SURGERY | Facility: CLINIC | Age: 34
End: 2024-04-10

## 2024-04-25 RX ORDER — METHYLPREDNISOLONE 4 MG/1
4 TABLET ORAL
Qty: 1 | Refills: 0 | Status: ACTIVE | COMMUNITY
Start: 2024-04-25 | End: 1900-01-01

## 2024-04-25 RX ORDER — MELOXICAM 15 MG/1
15 TABLET ORAL
Qty: 30 | Refills: 0 | Status: ACTIVE | COMMUNITY
Start: 2024-04-25 | End: 1900-01-01

## 2025-01-28 ENCOUNTER — APPOINTMENT (OUTPATIENT)
Dept: CARDIOLOGY | Facility: CLINIC | Age: 35
End: 2025-01-28
Payer: COMMERCIAL

## 2025-01-28 ENCOUNTER — NON-APPOINTMENT (OUTPATIENT)
Age: 35
End: 2025-01-28

## 2025-01-28 VITALS
HEIGHT: 69 IN | HEART RATE: 85 BPM | DIASTOLIC BLOOD PRESSURE: 80 MMHG | WEIGHT: 175 LBS | OXYGEN SATURATION: 98 % | SYSTOLIC BLOOD PRESSURE: 98 MMHG | BODY MASS INDEX: 25.92 KG/M2

## 2025-01-28 VITALS — DIASTOLIC BLOOD PRESSURE: 68 MMHG | SYSTOLIC BLOOD PRESSURE: 90 MMHG

## 2025-01-28 VITALS — DIASTOLIC BLOOD PRESSURE: 72 MMHG | SYSTOLIC BLOOD PRESSURE: 96 MMHG

## 2025-01-28 DIAGNOSIS — Z86.79 PERSONAL HISTORY OF OTHER DISEASES OF THE CIRCULATORY SYSTEM: ICD-10-CM

## 2025-01-28 DIAGNOSIS — Z82.41 FAMILY HISTORY OF SUDDEN CARDIAC DEATH: ICD-10-CM

## 2025-01-28 DIAGNOSIS — R42 DIZZINESS AND GIDDINESS: ICD-10-CM

## 2025-01-28 DIAGNOSIS — Z78.9 OTHER SPECIFIED HEALTH STATUS: ICD-10-CM

## 2025-01-28 DIAGNOSIS — Z00.00 ENCOUNTER FOR GENERAL ADULT MEDICAL EXAMINATION W/OUT ABNORMAL FINDINGS: ICD-10-CM

## 2025-01-28 DIAGNOSIS — R00.2 PALPITATIONS: ICD-10-CM

## 2025-01-28 PROCEDURE — 93242 EXT ECG>48HR<7D RECORDING: CPT

## 2025-01-28 PROCEDURE — 93306 TTE W/DOPPLER COMPLETE: CPT

## 2025-01-28 PROCEDURE — 93000 ELECTROCARDIOGRAM COMPLETE: CPT

## 2025-01-28 PROCEDURE — 99204 OFFICE O/P NEW MOD 45 MIN: CPT

## 2025-01-31 ENCOUNTER — TRANSCRIPTION ENCOUNTER (OUTPATIENT)
Age: 35
End: 2025-01-31

## 2025-01-31 LAB
ALBUMIN SERPL ELPH-MCNC: 4.6 G/DL
ALP BLD-CCNC: 72 U/L
ALT SERPL-CCNC: 19 U/L
ANION GAP SERPL CALC-SCNC: 16 MMOL/L
AST SERPL-CCNC: 15 U/L
BASOPHILS # BLD AUTO: 0.04 K/UL
BASOPHILS NFR BLD AUTO: 0.6 %
BILIRUB SERPL-MCNC: 0.2 MG/DL
BUN SERPL-MCNC: 14 MG/DL
CALCIUM SERPL-MCNC: 9.4 MG/DL
CHLORIDE SERPL-SCNC: 104 MMOL/L
CHOLEST SERPL-MCNC: 239 MG/DL
CO2 SERPL-SCNC: 21 MMOL/L
CREAT SERPL-MCNC: 0.89 MG/DL
EGFR: 114 ML/MIN/1.73M2
EOSINOPHIL # BLD AUTO: 0.32 K/UL
EOSINOPHIL NFR BLD AUTO: 4.4 %
ERYTHROCYTE [SEDIMENTATION RATE] IN BLOOD BY WESTERGREN METHOD: 12 MM/HR
ESTIMATED AVERAGE GLUCOSE: 120 MG/DL
GLUCOSE SERPL-MCNC: 90 MG/DL
HBA1C MFR BLD HPLC: 5.8 %
HCT VFR BLD CALC: 50.7 %
HDLC SERPL-MCNC: 41 MG/DL
HGB BLD-MCNC: 15.5 G/DL
IMM GRANULOCYTES NFR BLD AUTO: 0.4 %
LDLC SERPL CALC-MCNC: 150 MG/DL
LYMPHOCYTES # BLD AUTO: 2.15 K/UL
LYMPHOCYTES NFR BLD AUTO: 29.8 %
MAN DIFF?: NORMAL
MCHC RBC-ENTMCNC: 29.6 PG
MCHC RBC-ENTMCNC: 30.6 G/DL
MCV RBC AUTO: 96.8 FL
MONOCYTES # BLD AUTO: 0.65 K/UL
MONOCYTES NFR BLD AUTO: 9 %
NEUTROPHILS # BLD AUTO: 4.02 K/UL
NEUTROPHILS NFR BLD AUTO: 55.8 %
NONHDLC SERPL-MCNC: 198 MG/DL
PLATELET # BLD AUTO: 231 K/UL
POTASSIUM SERPL-SCNC: 4.2 MMOL/L
PROT SERPL-MCNC: 7.3 G/DL
RBC # BLD: 5.24 M/UL
RBC # FLD: 13.7 %
SODIUM SERPL-SCNC: 141 MMOL/L
T3FREE SERPL-MCNC: 3.03 PG/ML
T4 FREE SERPL-MCNC: 1.3 NG/DL
TRIGL SERPL-MCNC: 265 MG/DL
TSH SERPL-ACNC: 2.17 UIU/ML
WBC # FLD AUTO: 7.21 K/UL

## 2025-02-03 LAB — APO LP(A) SERPL-MCNC: 14.5 NMOL/L

## 2025-02-25 ENCOUNTER — APPOINTMENT (OUTPATIENT)
Dept: CARDIOLOGY | Facility: CLINIC | Age: 35
End: 2025-02-25
Payer: COMMERCIAL

## 2025-02-25 VITALS
DIASTOLIC BLOOD PRESSURE: 70 MMHG | WEIGHT: 169 LBS | SYSTOLIC BLOOD PRESSURE: 96 MMHG | HEIGHT: 69 IN | OXYGEN SATURATION: 98 % | BODY MASS INDEX: 25.03 KG/M2 | HEART RATE: 89 BPM

## 2025-02-25 DIAGNOSIS — Z78.9 OTHER SPECIFIED HEALTH STATUS: ICD-10-CM

## 2025-02-25 PROCEDURE — 93248 EXT ECG>7D<15D REV&INTERPJ: CPT

## 2025-02-25 PROCEDURE — G2211 COMPLEX E/M VISIT ADD ON: CPT

## 2025-02-25 PROCEDURE — 99214 OFFICE O/P EST MOD 30 MIN: CPT

## 2025-05-13 NOTE — REVIEW OF SYSTEMS
[As Noted in HPI] : as noted in HPI [Heartburn] : heartburn [Negative] : Heme/Lymph Adbry Pregnancy And Lactation Text: It is unknown if this medication will adversely affect pregnancy or breast feeding.

## (undated) DEVICE — GLV 7.5 PROTEXIS (WHITE)

## (undated) DEVICE — SPECIMEN CONTAINER 100ML

## (undated) DEVICE — GLV 7.5 PROTEXIS (BLUE)

## (undated) DEVICE — DRAPE THYROID 77" X 123"

## (undated) DEVICE — SOL IRR POUR NS 0.9% 500ML

## (undated) DEVICE — DRSG TELFA 3 X 8

## (undated) DEVICE — POSITIONER PATIENT SAFETY STRAP 3X60"

## (undated) DEVICE — POSITIONER FOAM EGG CRATE ULNAR 2PCS (PINK)

## (undated) DEVICE — WARMING BLANKET UPPER ADULT

## (undated) DEVICE — DRSG COMBINE 5X9"

## (undated) DEVICE — DRSG TAPE MEDIPORE 3"

## (undated) DEVICE — MEDICATION LABELS W MARKER

## (undated) DEVICE — LUBRICATING JELLY ONESHOT 1.25OZ

## (undated) DEVICE — SUT SILK 0 18" TIES

## (undated) DEVICE — LIGASURE SMALL JAW

## (undated) DEVICE — PREP BETADINE SPONGE STICKS

## (undated) DEVICE — SUT POLYSORB 3-0 30" V-20 UNDYED

## (undated) DEVICE — SOL IRR POUR H2O 250ML

## (undated) DEVICE — CATH IV SAFE INSYTE 14G X 1.75" (ORANGE)

## (undated) DEVICE — DRAPE INSTRUMENT POUCH 6.75" X 11"

## (undated) DEVICE — GLV 7.5 PROTEXIS (CREAM) NEU-THERA

## (undated) DEVICE — TAPE SILK 3"

## (undated) DEVICE — SUT SOFSILK 2 60" TIES

## (undated) DEVICE — SUT POLYSORB 3-0 27" GS-22 UNDYED

## (undated) DEVICE — VENODYNE/SCD SLEEVE CALF MEDIUM

## (undated) DEVICE — SUT CHROMIC 3-0 30" V-20

## (undated) DEVICE — DRAPE TOWEL BLUE 17" X 24"

## (undated) DEVICE — CATH IV SAFE BC 18G X 1.16" (GREEN)

## (undated) DEVICE — PACK MINOR